# Patient Record
Sex: FEMALE | Race: WHITE | Employment: FULL TIME
[De-identification: names, ages, dates, MRNs, and addresses within clinical notes are randomized per-mention and may not be internally consistent; named-entity substitution may affect disease eponyms.]

---

## 2017-07-15 ENCOUNTER — HEALTH MAINTENANCE LETTER (OUTPATIENT)
Age: 37
End: 2017-07-15

## 2025-04-05 ENCOUNTER — APPOINTMENT (OUTPATIENT)
Dept: GENERAL RADIOLOGY | Facility: CLINIC | Age: 45
End: 2025-04-05
Attending: EMERGENCY MEDICINE
Payer: COMMERCIAL

## 2025-04-05 ENCOUNTER — HOSPITAL ENCOUNTER (EMERGENCY)
Facility: CLINIC | Age: 45
Discharge: HOME | End: 2025-04-05
Attending: EMERGENCY MEDICINE | Admitting: EMERGENCY MEDICINE
Payer: COMMERCIAL

## 2025-04-05 VITALS
RESPIRATION RATE: 18 BRPM | HEART RATE: 68 BPM | TEMPERATURE: 97.9 F | SYSTOLIC BLOOD PRESSURE: 149 MMHG | OXYGEN SATURATION: 93 % | DIASTOLIC BLOOD PRESSURE: 80 MMHG

## 2025-04-05 DIAGNOSIS — M25.511 ACUTE PAIN OF RIGHT SHOULDER: ICD-10-CM

## 2025-04-05 PROCEDURE — 73030 X-RAY EXAM OF SHOULDER: CPT | Mod: RT

## 2025-04-05 PROCEDURE — 250N000013 HC RX MED GY IP 250 OP 250 PS 637: Performed by: EMERGENCY MEDICINE

## 2025-04-05 PROCEDURE — 250N000011 HC RX IP 250 OP 636: Mod: JZ | Performed by: EMERGENCY MEDICINE

## 2025-04-05 PROCEDURE — 99284 EMERGENCY DEPT VISIT MOD MDM: CPT | Performed by: EMERGENCY MEDICINE

## 2025-04-05 PROCEDURE — 96372 THER/PROPH/DIAG INJ SC/IM: CPT | Performed by: EMERGENCY MEDICINE

## 2025-04-05 RX ORDER — OXYCODONE HYDROCHLORIDE 5 MG/1
5 TABLET ORAL EVERY 6 HOURS PRN
Qty: 10 TABLET | Refills: 0 | Status: SHIPPED | OUTPATIENT
Start: 2025-04-05

## 2025-04-05 RX ORDER — IBUPROFEN 600 MG/1
600 TABLET, FILM COATED ORAL ONCE
Status: COMPLETED | OUTPATIENT
Start: 2025-04-05 | End: 2025-04-05

## 2025-04-05 RX ADMIN — HYDROMORPHONE HYDROCHLORIDE 1 MG: 1 INJECTION, SOLUTION INTRAMUSCULAR; INTRAVENOUS; SUBCUTANEOUS at 03:59

## 2025-04-05 RX ADMIN — IBUPROFEN 600 MG: 600 TABLET, FILM COATED ORAL at 03:59

## 2025-04-05 ASSESSMENT — ACTIVITIES OF DAILY LIVING (ADL)
ADLS_ACUITY_SCORE: 41
ADLS_ACUITY_SCORE: 41

## 2025-04-05 ASSESSMENT — COLUMBIA-SUICIDE SEVERITY RATING SCALE - C-SSRS
1. IN THE PAST MONTH, HAVE YOU WISHED YOU WERE DEAD OR WISHED YOU COULD GO TO SLEEP AND NOT WAKE UP?: NO
2. HAVE YOU ACTUALLY HAD ANY THOUGHTS OF KILLING YOURSELF IN THE PAST MONTH?: NO
6. HAVE YOU EVER DONE ANYTHING, STARTED TO DO ANYTHING, OR PREPARED TO DO ANYTHING TO END YOUR LIFE?: NO

## 2025-04-05 NOTE — ED PROVIDER NOTES
ED Provider Note  [facility]      History     Chief Complaint   Patient presents with    Shoulder Pain     HPI  Betsy Patiño is a 44 year old female who is right-hand dominant, presenting the emergency department with concerns regarding severe right shoulder pain.  Patient awoke with this during the morning hours, approximately 16 hours prior to emergency department arrival.  States that she did not sleep on this funny.  No trauma, fall, or other injury.  Complains of severe, unrelenting pain, worsened with movement.  Does radiate slightly to the mid humerus region.  No numbness, or tingling.  No prior shoulder surgery        Independent Historian:        Review of External Notes:          Allergies:  Allergies   Allergen Reactions    Penicillin [Penicillins] Hives    Prozac [Fluoxetine Hcl] Hives    Venlafaxine Other (See Comments)     Side effects were increased sweating and insomnia.       Problem List:    Patient Active Problem List    Diagnosis Date Noted    Metrorrhagia 11/08/2011     Priority: Medium    HYPERLIPIDEMIA LDL GOAL <130 10/31/2010     Priority: Medium    Moderate major depression (H) 07/26/2010     Priority: Medium     She was first diagnosed with depression in 1999. Her symptoms should be considered chronic. She is a candidate for indefinite therapy.     Allergic to Prozac with hives. Tried another med, either Zoloft in the past and that was ineffective;  Effexor was given at the [facility] in [place] in 2007, and this was effective, but side effects were increased sweating and insomnia. Celexa caused weight gain.       Hypercholesteremia 03/16/2009     Priority: Medium     Intolerant of Niacin CR.           Past Medical History:    Past Medical History:   Diagnosis Date    Hypercholesteremia     Metrorrhagia 11/8/2011       Past Surgical History:    Past Surgical History:   Procedure Laterality Date    BREAST SURGERY  2004    breast reduction    [facility]  SURGICAL PATHOLOGY      ZZC  DELIVERY ONLY      Twins       Family History:    Family History   Problem Relation Age of Onset    Heart Disease Father         triple bypass       Social History:  Marital Status:   [2]  Social History     Tobacco Use    Smoking status: Never    Smokeless tobacco: Never   Substance Use Topics    Alcohol use: Yes     Comment: occasionally    Drug use: No        Medications:    escitalopram (LEXAPRO) 10 MG tablet  ezetimibe (ZETIA) 10 MG tablet  oxyCODONE (ROXICODONE) 5 MG tablet  simvastatin (ZOCOR) 80 MG tablet          Review of Systems  A medically appropriate review of systems was performed with pertinent positives and negatives noted in the HPI, and all other systems negative.    Physical Exam   Patient Vitals for the past 24 hrs:   BP Temp Temp src Pulse Resp SpO2   25 0330 (!) 149/80 -- -- 68 -- 93 %   25 0324 (!) 157/85 97.9  F (36.6  C) Oral 97 18 98 %          Physical Exam  General: alert and in  acute distress on arrival  Head: atraumatic, normocephalic  Lungs:  nonlabored  CV:  extremities warm and perfused  Abd: nondistended  Skin: no rashes, no diaphoresis and skin color normal  Neuro: Patient awake, alert, speech is fluent,   Psychiatric: affect/mood normal,        ED Course                 Procedures                 None         Results for orders placed or performed during the hospital encounter of 25 (from the past 24 hours)   Shoulder XR, 2 view, right    Narrative    EXAM: XR SHOULDER RIGHT 2 VIEWS  LOCATION: [facility]  DATE: 2025    INDICATION: pain.  atraumatic  COMPARISON: None.      Impression    IMPRESSION: Right shoulder is normally located without fracture. Mild glenohumeral and AC joint degenerative change. Right upper lobe granuloma.       MEDICATIONS GIVEN IN THE EMERGENCY DEPARTMENT:  Medications   HYDROmorphone (DILAUDID) injection 1 mg (1 mg Intramuscular $Given 25 6863)   ibuprofen  (ADVIL/MOTRIN) tablet 600 mg (600 mg Oral $Given 4/5/25 3992)           Independent Interpretation (X-rays, CTs, rhythm strip):  X-ray showed no evidence of acute fracture.  No other acute findings.  Images personally reviewed        Consultations/Discussion of Management or Tests:         Social Determinants of Health affecting care:         Assessments & Plan (with Medical Decision Making)  44 year old female who presents to the Emergency Department for evaluation of sided  shoulder pain.  Symptoms present over the past 16 hours.  Atraumatic.  Complaining of severe, unrelenting, 10 out of 10 pain.  Pain is worsened with range of motion.  No chest pain.  I feel that this represents musculoskeletal etiology.  Analgesic medications given.    X-ray images showed no evidence of acute bony abnormality.  Patient did have improvement with the above medications.    Potential for rotator cuff type injury.  I feel less likely radicular pains, and less likely nerve related condition.    Patient is encouraged to follow-up with primary care provider, or orthopedics if ongoing symptoms, with return precautions which are discussed.       I have reviewed the nursing notes.    I have reviewed the findings, diagnosis, plan and need for follow up with the patient.         Medical Decision Making  The patient's presentation was of moderate complexity (an undiagnosed new problem with uncertain prognosis).    The patient's evaluation involved:  ordering and/or review of 1 test(s) in this encounter (see separate area of note for details)  independent interpretation of testing performed by another health professional (see separate area of note for details)    The patient's management necessitated high risk (a parenteral controlled substance).        NEW PRESCRIPTIONS STARTED AT TODAY'S ER VISIT  New Prescriptions    OXYCODONE (ROXICODONE) 5 MG TABLET    Take 1 tablet (5 mg) by mouth every 6 hours as needed for severe pain.       Final  diagnoses:   Acute pain of right shoulder       4/5/2025   [facility] [place] EMERGENCY DEPT       Bill Irene MD  04/05/25 0457

## 2025-04-05 NOTE — ED TRIAGE NOTES
Woke up yesterday with R shoulder pain. No known injury or trauma. Denies numbness or tingling. Last tool tylenol 2300.

## 2025-04-05 NOTE — DISCHARGE INSTRUCTIONS
Follow up with Orthopedics if not improved.     Take ibuprofen, 400 mg, 4 times per day if needed for pain.  You may add acetaminophen 1000 mg 4 times per day if needed for pain.    You may add oxycodone 5 mg, 1-2 tablets up to every 6 hours if needed for pain.  Try to use this primarily only at night to help with sleep.    Do not use alcohol, operate machinery, drive, or climb on ladders, or perform other complex motor activity or make important decisions for 8 hours after taking oxycodone. Use docusate (100mg) 2 times a day to prevent constipation while on narcotics.